# Patient Record
Sex: MALE
[De-identification: names, ages, dates, MRNs, and addresses within clinical notes are randomized per-mention and may not be internally consistent; named-entity substitution may affect disease eponyms.]

---

## 2017-10-09 ENCOUNTER — HOSPITAL ENCOUNTER (OUTPATIENT)
Dept: HOSPITAL 5 - CATHLABREC | Age: 62
Discharge: HOME | End: 2017-10-09
Attending: INTERNAL MEDICINE
Payer: COMMERCIAL

## 2017-10-09 VITALS — DIASTOLIC BLOOD PRESSURE: 79 MMHG | SYSTOLIC BLOOD PRESSURE: 127 MMHG

## 2017-10-09 DIAGNOSIS — E66.9: ICD-10-CM

## 2017-10-09 DIAGNOSIS — Z87.19: ICD-10-CM

## 2017-10-09 DIAGNOSIS — G47.30: ICD-10-CM

## 2017-10-09 DIAGNOSIS — Z87.01: ICD-10-CM

## 2017-10-09 DIAGNOSIS — I10: ICD-10-CM

## 2017-10-09 DIAGNOSIS — K21.9: ICD-10-CM

## 2017-10-09 DIAGNOSIS — I48.1: Primary | ICD-10-CM

## 2017-10-09 DIAGNOSIS — Z87.891: ICD-10-CM

## 2017-10-09 LAB
APTT BLD: 43.3 SEC. (ref 24.2–36.6)
INR PPP: 1.7 (ref 0.87–1.13)

## 2017-10-09 PROCEDURE — 85610 PROTHROMBIN TIME: CPT

## 2017-10-09 PROCEDURE — 36415 COLL VENOUS BLD VENIPUNCTURE: CPT

## 2017-10-09 PROCEDURE — 93010 ELECTROCARDIOGRAM REPORT: CPT

## 2017-10-09 PROCEDURE — 93005 ELECTROCARDIOGRAM TRACING: CPT

## 2017-10-09 PROCEDURE — 92960 CARDIOVERSION ELECTRIC EXT: CPT

## 2017-10-09 PROCEDURE — 85730 THROMBOPLASTIN TIME PARTIAL: CPT

## 2017-10-09 NOTE — CARDIAC CATHERIZATION REPORT
ELECTRICAL CARDIOVERSION



CLINICAL INFORMATION:  The patient is a 62-year-old white gentleman with a

history of persistent atrial fibrillation of many months' duration with well

controlled heart rate and anticoagulant with Xarelto for last many months.  He

is having symptoms of shortness of breath and decreased exercise tolerance.  He

could not perform the strenuous activities he used to perform.  Hence, it was

decided to convert him to sinus rhythm to see if his symptoms improved.  Hence

option of electrical cardioversion was given and the patient was explained of

the procedure, potential complications and alternatives of therapy available. 

He is agreeable to try cardioversion.  Hence, he is electively scheduled for

electrical cardioversion.  He is aware of the procedure, potential complications

of thromboembolism and cardiac arrhythmia.  He signed informed consent.  The

patient was brought to the catheterization laboratory in a fasting condition. 

The patient was brought to the catheterization laboratory.  The patient was

prepared and electrical pads were applied on the front and back of the chest. 

Anesthesia was here to sedate him and was sedated with IV propofol.  After

sedation, the patient was given 150 joules of synchronized cardioversion, which

converted him to sinus rhythm promptly.  The patient remained in sinus rhythm

during monitoring.  The patient will continue rest of the medications.  The

patient will be monitored till he comes out of this moderate sedation.  Once his

sedation resolves, the patient will be transferred to outpatient area for

monitoring for the next few hours.  His blood pressure has been stable.



FINAL IMPRESSION:  Successful uncomplicated electrical cardioversion of 
persistent

atrial fibrillation to sinus rhythm with 150 joules x 1.  The patient will

continue his previous medications, namely metoprolol succinate 100 mg once a day

and Xarelto 20 mg a day.





DD: 10/09/2017 10:24

DT: 10/09/2017 11:20

JOB# [id number]  [id number]

ENRIQUE/BRAD DARBY

## 2017-10-09 NOTE — ANESTHESIA CONSULTATION
Anesthesia Consult and Med Hx


Date of service: 10/09/17





- Airway


Anesthetic Teeth Evaluation: Dentures


ROM Head & Neck: Adequate


Mental/Hyoid Distance: Adequate


Mallampati Class: Class II


Intubation Access Assessment: Probably Good





- Pulmonary Exam


CTA: Yes





- Cardiac Exam


Cardiac Exam: RRR





- Pre-Operative Health Status


ASA Pre-Surgery Classification: ASA3


Proposed Anesthetic Plan: General, MAC





- Pulmonary


Hx Smoking: Yes (1-2 ppd x 40 yrs, quit at age 55)


Hx Pneumonia: Yes (6/2017)


Hx Sleep Apnea: Yes





- Cardiovascular System


Hx Hypertension: Yes (EF 45-50%)


Hx Cardia Arrhythmia: Yes (atrial fibrillation)





- Central Nervous System


Hx Psychiatric Problems: No





- Gastrointestinal


Hx Gastroesophageal Reflux Disease: Yes (controlled)





- Other Systems


Hx Cancer: No


Hx Obesity: Yes

## 2017-10-09 NOTE — SHORT STAY SUMMARY
Short Stay Documentation


Date of service: 10/09/17





- History


H&P: obtained from office





- Allergies and Medications


Current Medications: 


 Allergies





No Known Allergies Allergy (Verified 10/09/17 07:03)


 





 Home Medications











 Medication  Instructions  Recorded  Confirmed  Last Taken  Type


 


Chlorpheniramine Maleate [Allergy] 4 mg PO QDAY 10/09/17 10/09/17 10/09/17 04:

30 History


 


Metoprolol [Lopressor] 100 mg PO QDAY 10/09/17 10/09/17 10/09/17 04:30 History


 


Omega-3S/Dha/Epa/Fish Oil [Conifer-3 1 each PO QDAY 10/09/17 10/09/17 10/09/17 04:

30 History





Fish Oil 1,000 mg Sfgl]     


 


Omeprazole 20 mg PO QDAY 10/09/17 10/09/17 10/09/17 04:30 History


 


Rivaroxaban [Xarelto] 20 mg PO QDAY 10/09/17 10/09/17 10/09/17 04:30 History








Active Medications





Sodium Chloride (Nacl 0.9% 500 Ml)  500 mls @ 50 mls/hr IV AS DIRECT ODETTE


   Last Admin: 10/09/17 08:15 Dose:  50 mls/hr











- Brief post op/procedure progress note


Date of procedure: 10/09/17


Pre-op diagnosis: AFIB


Post-op diagnosis: same


Procedure: 


DCCV - SEE REPORT   





Estimated blood loss: none


Condition: stable





- Disposition


Condition at discharge: Good


Disposition: DC-01 TO HOME OR SELFCARE





- Discharge Diagnoses


(1) Atrial fibrillation


Status: Chronic   


Qualifiers: 


   Atrial fibrillation type: A 





Short Stay Discharge Plan


Activity: no restrictions


Follow up with: 


MATI LAWSON MD [Staff Physician] - 7 Days


Prescriptions: 


Metoprolol Succinate [Toprol Xl] 100 mg PO DAILY #30 tab.er.24h

## 2018-01-23 ENCOUNTER — HOSPITAL ENCOUNTER (OUTPATIENT)
Dept: HOSPITAL 5 - CATHLABREC | Age: 63
Discharge: HOME | End: 2018-01-23
Attending: INTERNAL MEDICINE
Payer: COMMERCIAL

## 2018-01-23 VITALS — SYSTOLIC BLOOD PRESSURE: 127 MMHG | DIASTOLIC BLOOD PRESSURE: 76 MMHG

## 2018-01-23 DIAGNOSIS — G47.33: ICD-10-CM

## 2018-01-23 DIAGNOSIS — Z87.891: ICD-10-CM

## 2018-01-23 DIAGNOSIS — I10: ICD-10-CM

## 2018-01-23 DIAGNOSIS — Z79.01: ICD-10-CM

## 2018-01-23 DIAGNOSIS — Z82.49: ICD-10-CM

## 2018-01-23 DIAGNOSIS — E66.9: ICD-10-CM

## 2018-01-23 DIAGNOSIS — I48.1: Primary | ICD-10-CM

## 2018-01-23 LAB
APTT BLD: 40.2 SEC. (ref 24.2–36.6)
BASOPHILS # (AUTO): 0.1 K/MM3 (ref 0–0.1)
BASOPHILS NFR BLD AUTO: 0.5 % (ref 0–1.8)
EOSINOPHIL # BLD AUTO: 0.4 K/MM3 (ref 0–0.4)
EOSINOPHIL NFR BLD AUTO: 3.4 % (ref 0–4.3)
HCT VFR BLD CALC: 53.2 % (ref 35.5–45.6)
HGB BLD-MCNC: 17.4 GM/DL (ref 11.8–15.2)
INR PPP: 1.48 (ref 0.87–1.13)
LYMPHOCYTES # BLD AUTO: 3.2 K/MM3 (ref 1.2–5.4)
LYMPHOCYTES NFR BLD AUTO: 27.2 % (ref 13.4–35)
MCH RBC QN AUTO: 32 PG (ref 28–32)
MCHC RBC AUTO-ENTMCNC: 33 % (ref 32–34)
MCV RBC AUTO: 97 FL (ref 84–94)
MONOCYTES # (AUTO): 1.1 K/MM3 (ref 0–0.8)
MONOCYTES % (AUTO): 9 % (ref 0–7.3)
PLATELET # BLD: 148 K/MM3 (ref 140–440)
RBC # BLD AUTO: 5.5 M/MM3 (ref 3.65–5.03)

## 2018-01-23 PROCEDURE — 85025 COMPLETE CBC W/AUTO DIFF WBC: CPT

## 2018-01-23 PROCEDURE — 85610 PROTHROMBIN TIME: CPT

## 2018-01-23 PROCEDURE — 36415 COLL VENOUS BLD VENIPUNCTURE: CPT

## 2018-01-23 PROCEDURE — 93010 ELECTROCARDIOGRAM REPORT: CPT

## 2018-01-23 PROCEDURE — 85730 THROMBOPLASTIN TIME PARTIAL: CPT

## 2018-01-23 PROCEDURE — 92960 CARDIOVERSION ELECTRIC EXT: CPT

## 2018-01-23 PROCEDURE — 93005 ELECTROCARDIOGRAM TRACING: CPT

## 2018-01-23 NOTE — CARDIAC CATHERIZATION REPORT
CARDIOVERSION REPORT



The patient is a 62-year-old white gentleman with history of hypertension,

history of sleep apnea and his paroxysmal atrial fibrillation was cardioverted

few months ago with recurrence of atrial fibrillation, presently started on

sotalol 80 mg b.i.d.  Hence, the patient is scheduled for cardioversion on

sotalol 80 mg b.i.d.  The patient is taking Xarelto for the last many months,

regularly.



The patient gets short of breath with exertion and has mild LV dysfunction,

ejection fraction of 45% to 50%.  Considering the above symptoms, it was felt

that the patient would benefit from cardioversion to regular rhythm.



The patient was explained of the procedure, potential complications and

alternatives of therapy available.  The patient would like to try cardioversion.



The patient was evaluated by anesthetist and it was felt he is a candidate for

IV sedation.



The patient was brought to the catheterization laboratory in a fasting

condition.  He was prepared in usual fashion.  The patient was sedated with

propofol and ketamine by Anesthesia and once adequate sedation was obtained

cardioversion was performed in a standard way.  Pads were attached to the front

and back of the chest and started at 50 joules and cardioverted without success

and gradually increased to 100 joules and 200 joules without any effect  on the

rhythm.  At 300 joules the patient converted to sinus rhythm at around 65 beats

per minute.  The patient tolerated the procedure well.  Blood pressure has been

stable throughout the procedure, no ventricular arrhythmia or pauses noted

during this procedure.  The patient was monitored for sedation by Anesthesia. 

Few minutes after conversion patient still in sinus rhythm, the patient 
recovered from

anesthesia and is able to talk and able to breathe well.  He was comfortable. 

The patient was transferred to the outpatient area.



FINAL IMPRESSION:  Uncomplicated cardioversion of atrial fibrillation to sinus

rhythm under IV sedation.  The patient is in sinus rhythm without any symptoms

of chest pain or other complaints.



The patient will be continued on sotalol 80 mg b.i.d. in addition to Xarelto. 

He will be followed up as an outpatient.  Discussed with the patient and his

brother who accompanied him.





DD: 01/23/2018 11:26

DT: 01/23/2018 13:07

JOB# [id number]  [id number]

ENRIQUE/BRAD DARBY

## 2018-01-23 NOTE — ANESTHESIA CONSULTATION
Anesthesia Consult and Med Hx


Date of service: 01/23/18





- Airway


Anesthetic Teeth Evaluation: Good


ROM Head & Neck: Adequate


Mental/Hyoid Distance: Adequate


Mallampati Class: Class III


Intubation Access Assessment: Possibly Difficult





- Pre-Operative Health Status


ASA Pre-Surgery Classification: ASA3


Proposed Anesthetic Plan: MAC





- Pulmonary


Hx Smoking: Yes (1-2 ppd x 40 yrs, quit at age 55)


Hx Pneumonia: Yes (6/2017)


Hx Sleep Apnea: Yes





- Cardiovascular System


Hx Hypertension: Yes (EF 45-50%)


Hx Cardia Arrhythmia: Yes (atrial fibrillation)





- Central Nervous System


Hx Psychiatric Problems: No





- Gastrointestinal


Hx Gastroesophageal Reflux Disease: Yes (controlled)





- Other Systems


Hx Cancer: No


Hx Obesity: Yes (BMI 34.7)